# Patient Record
Sex: FEMALE | Race: WHITE | NOT HISPANIC OR LATINO | Employment: STUDENT | URBAN - METROPOLITAN AREA
[De-identification: names, ages, dates, MRNs, and addresses within clinical notes are randomized per-mention and may not be internally consistent; named-entity substitution may affect disease eponyms.]

---
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
## 2023-02-08 ENCOUNTER — TELEPHONE (OUTPATIENT)
Dept: NEUROLOGY | Facility: CLINIC | Age: 18
End: 2023-02-08

## 2023-02-08 DIAGNOSIS — R93.0 ABNORMAL MRI OF HEAD: ICD-10-CM

## 2023-02-08 DIAGNOSIS — E23.6 PITUITARY GLAND ENLARGED (HCC): Primary | ICD-10-CM

## 2023-02-08 NOTE — TELEPHONE ENCOUNTER
Did she ever have an MRI of the pituitary after this as I don't see any in our system?  I can TRY to get one approved if she has not had one with just telephone encounter with prior results.  However, might require a visit to get approval.  But it is not urgent at this time.  I will order the MRI.  Please have her get blood work done at least 2 days before MRI and have her call central scheduling

## 2023-02-08 NOTE — TELEPHONE ENCOUNTER
Received VM transcription from [phone]:    Hi, this is Lucy Mcdaniel. I was calling to speak with Dr. Gleason. I know something had been brought up with one of my neurologist previously and I aracely wanted her to follow up with it. It was just one of those things I just remembered. If you can give me a call back whenever that would be. Great, thank you.  --------------------------------------------------------    Spoke with pt and she states one of her prev neurologist had her get an MRI. Says there was something about her pituitary gland and she's wondering if some of the issues she's having could be related to this.   Pt says the MRI was from years ago, not exactly sure when. Pt asking Rosibel to review and see whether or not pt should get another MRI.    MRI results from 12/7/2017 in care everywhere:  Narrative    EXAM   MR BRAIN WITH/WITHOUT CONTRAST-9/20/2017 5:55 pm     HISTORY   R51 HEADACHE HEADACHES/MIGRAINES     COMPARISON   MRI BRAIN WITHOUT CONTRAST dated 11/28/2014     TECHNIQUE   Multiplanar multi-sequence images of the brain were obtained without and with IV contrast     FINDINGS   There is no evidence of mass effect, midline shift, subacute intraparenchymal hemorrhage, or extra-axial   fluid collections.   The ventricles are normal in size, shape, and configuration.   There is no evidence of abnormal restricted diffusion to suggest acute infarct.   Expected major vascular flow voids are seen.There is no evidence of abnormal enhancement.   Pituitary gland is 8.5 mm in craniocaudad dimension, which is prominent.  Dedicated MRI of the pituitary   gland can be performed.   Cerebellar tonsils:  Low lying, 2.8 mm below the level of the foramen magnum. Clivus:  normal.   Visualized paranasal sinuses:  Well aerated   Visualized mastoid air cells:  Well aerated     IMPRESSION   Pituitary gland is 8.5 mm in craniocaudad dimension, which is prominent.  Dedicated MRI of the pituitary   gland can be performed.    Low lying cerebellar tonsils, 2.8 mm below the level of the foramen magnum.        Rosibel - Please advise.    Best  [phone], ok to leave detailed message.

## 2023-02-13 NOTE — TELEPHONE ENCOUNTER
I ordered Bun and creatinine.  Need to check her kidney function prior to contrast.  Can she get a copy of the images on CD to have uploaded to our system as well?

## 2023-02-13 NOTE — TELEPHONE ENCOUNTER
Called and advised pt of all of the below. She verbalized clear understanding. Pt states that she had MRI of the pituitary in 2017.   CS already scheduled her MRI brain pituitary wo and w contrast.. It is scheduled on 3/20/23. Pt states that she was not to that she needed bw done prior to the procedure.      Rosibel, what bw you would like to order?

## 2023-02-14 NOTE — TELEPHONE ENCOUNTER
Pt made aware of the below. She verbalized understanding.   Pt states that she is unable to get the images or CD as she does not know where she had it done. It was done in 2017 and she was not in charge of her medical records then. She does not talk to her parents anymore.    chilo

## 2023-03-03 ENCOUNTER — PROCEDURE VISIT (OUTPATIENT)
Dept: NEUROLOGY | Facility: CLINIC | Age: 18
End: 2023-03-03

## 2023-03-03 VITALS — TEMPERATURE: 96.8 F | SYSTOLIC BLOOD PRESSURE: 110 MMHG | DIASTOLIC BLOOD PRESSURE: 80 MMHG

## 2023-03-03 DIAGNOSIS — G43.709 CHRONIC MIGRAINE WITHOUT AURA WITHOUT STATUS MIGRAINOSUS, NOT INTRACTABLE: Primary | ICD-10-CM

## 2023-03-03 DIAGNOSIS — G43.009 MIGRAINE WITHOUT AURA AND WITHOUT STATUS MIGRAINOSUS, NOT INTRACTABLE: ICD-10-CM

## 2023-03-03 RX ORDER — RIMEGEPANT SULFATE 75 MG/75MG
75 TABLET, ORALLY DISINTEGRATING ORAL EVERY OTHER DAY
Qty: 16 TABLET | Refills: 11 | Status: SHIPPED | OUTPATIENT
Start: 2023-03-03

## 2023-03-03 NOTE — PROGRESS NOTES

## 2023-03-20 ENCOUNTER — HOSPITAL ENCOUNTER (OUTPATIENT)
Dept: MRI IMAGING | Facility: HOSPITAL | Age: 18
Discharge: HOME | End: 2023-03-20

## 2023-03-20 DIAGNOSIS — E23.6 PITUITARY GLAND ENLARGED (HCC): ICD-10-CM

## 2023-03-20 DIAGNOSIS — R93.0 ABNORMAL MRI OF HEAD: ICD-10-CM

## 2023-03-20 RX ADMIN — GADOBUTROL 7 ML: 604.72 INJECTION INTRAVENOUS at 17:27

## 2023-04-19 PROBLEM — G43.709 CHRONIC MIGRAINE WITHOUT AURA WITHOUT STATUS MIGRAINOSUS, NOT INTRACTABLE: Status: ACTIVE | Noted: 2020-05-18

## 2023-05-11 ENCOUNTER — TELEPHONE (OUTPATIENT)
Dept: NEUROLOGY | Facility: CLINIC | Age: 18
End: 2023-05-11

## 2023-05-11 NOTE — TELEPHONE ENCOUNTER
Jennifer Martinez patient mother called to give information botox is to be billed with [facility] not [employer]      ID # [id number]  Group Rx [id number]  [address] [facility] [id number]    Please call Jennifer Martinez with any questions at [phone]

## 2023-05-17 ENCOUNTER — TELEPHONE (OUTPATIENT)
Dept: NEUROLOGY | Facility: CLINIC | Age: 18
End: 2023-05-17

## 2023-05-17 NOTE — TELEPHONE ENCOUNTER
[facility] and spoke to Elis in [employer] to have an account added for patients Botox order  Provided Elis with all of the patients demographics and current insurance info  Elis processed and created patient account and then transferred me to a pharmacist for the verbal Botox order needed  Provided the following Verbal RX to Kaylin (pharmacist) for:     Botox-200 units  Qty:1  Diagnosis: G43 709, Chronic Migraine  Sig: Inject up to 200 UNITS I M into the various sites in the head and neck once every three months for one year with  Refill:3    Kaylin did expedite this order to be processed STAT per my request     Will follow up on the status of this order

## 2023-05-17 NOTE — TELEPHONE ENCOUNTER
Called Yaima at Phone Number provided and got disconnected error  Called patient and she provided me with her Mothers correct Ph# of [phone]  Called Yaima(mother) who confirmed patients Botox prior-auth will need to be billed through their [facility]  Ronni Montero know I have submitted Botox authorization through patients [facility] benefit

## 2023-05-17 NOTE — TELEPHONE ENCOUNTER
"Received authorization response letter via fax from [address] [place] [address]  stating: \"In researching your fax request, the member's prescription benefit coverage indicates a Prior Authorization is NOT required  For additional questions regarding the medication's coverage under the [facility] plan, please contact Customer Service Ph: [phone]  \"    Called Customer Service and spoke to Michelle Sroensen in the [facility] who provided me with the following info:    Under Patients- Pharmacy Benefits:  Kb-[id number] for Botox-200 Units  Qty:1  Diagnosis: G43 709, Chronic Migraine  Sig: Inject up to 200 UNITS I M into the various sites in the head and neck once every three months  Since [facility] no longer [facility] Botox medication in order for me to find out which Specialty Pharmacy can dispense Botox for patients benefit plan Michelle Sorensen provided me with the following contact info: Patients Health Plan:  PEBTF-(PA-Employees-Benefit-[facility])  Care-Team: # [phone]                          "

## 2023-05-17 NOTE — TELEPHONE ENCOUNTER
[facility]:     Botox-200 units: [id number]  DX: G43 709, Chronic Migraine  Awaiting approval/denial response  Will follow up on the status of pending authorization requested

## 2023-05-17 NOTE — TELEPHONE ENCOUNTER
Called PEF: Care-Team at  Ph# [phone] and spoke to [facility] in the Benefits Department who provided me with the following list of Specialty Pharmacies that can dispense Botox for this patients plan:    [facility]  [facility]- (Ph: [phone])     Will contact [address] [place] [address] Edward Erazo for patients Botox order request

## 2023-05-22 NOTE — TELEPHONE ENCOUNTER
[facility] and spoke with Gay Sam regarding the delivery status of patients Botox medication  Michael Hayward informed me that patients Botox order/delivery is pending patients verbal consent to have Botox delivered along with awaiting for patient to apply for the co-pay assistance program since she does owe co-pay amount of $120 00 for their Botox      Michael Hayward informed me that she was able to get in contact with the patient in regards to this and that the patient informed her that she will be calling and applying for the Botox assistance program this week in order for her Botox medication to be delivered to our office prior to her 6/02/2023 Botox-Appt  I also called patient and left VM message regarding this status as well      Will follow up on the status of this order later this week

## 2023-05-24 NOTE — TELEPHONE ENCOUNTER
[facility] and spoke with Emperatriz Baez regarding the delivery status of patients Botox medication  Michael informed me that patients Botox order/delivery is still pending patients verbal consent to have Botox delivered along with awaiting for patient to apply for the co-pay assistance program since she does owe co-pay amount of $120 00 for their Botox  Called patient and left 2nd VM message requesting they reach out to [facility] ASAP and provide them w/ any and all info needed in order to have Botox medication delivered to our office prior to their upcoming Botox appointment on 6/02/2023  Will follow up on the status of this order by Friday 5/26/2023

## 2023-05-25 NOTE — TELEPHONE ENCOUNTER
Contacted pt regarding order will Mariaelena sarkar due to Rep at [facility] stating pt called and stated they wanted to check into other pharm and will re-contact  Pt mom returned call and stated, not sure as to what that was about  Writer advised of use of sp now an the process for further need; co-pay/consent  Pt mom stated they Botox savings and not sure of what to do  Writer advised to contact with all that information, give consent and once benefits for payment for remaining balance is taken care of they will contact our office for delivery or we will call next business day   Mom satisfied with call and advised will complete with daughter later this afternoon  Will continue to follow up with Mariaelena sarkar

## 2023-05-26 NOTE — TELEPHONE ENCOUNTER
[facility] and scheduled delivery with Liza Mcardle for:    Botox-200 units  Qty:1  Delivery to [facility]  Scheduled for 5/31/2023  Via: FedEx    Please advise if medication doesn't arrive

## 2023-06-02 ENCOUNTER — PROCEDURE VISIT (OUTPATIENT)
Dept: NEUROLOGY | Facility: CLINIC | Age: 18
End: 2023-06-02

## 2023-06-02 VITALS — HEART RATE: 104 BPM | TEMPERATURE: 97.4 F | DIASTOLIC BLOOD PRESSURE: 73 MMHG | SYSTOLIC BLOOD PRESSURE: 131 MMHG

## 2023-06-02 DIAGNOSIS — G43.709 CHRONIC MIGRAINE WITHOUT AURA WITHOUT STATUS MIGRAINOSUS, NOT INTRACTABLE: Primary | ICD-10-CM

## 2023-06-02 NOTE — PROGRESS NOTES
"  Universal Protocol   Consent: Verbal consent obtained  Written consent obtained  Risks and benefits: risks, benefits and alternatives were discussed  Consent given by: patient  Time out: Immediately prior to procedure a \"time out\" was called to verify the correct patient, procedure, equipment, support staff and site/side marked as required  Patient understanding: patient states understanding of the procedure being performed  Patient consent: the patient's understanding of the procedure matches consent given  Procedure consent: procedure consent matches procedure scheduled  Relevant documents: relevant documents present and verified  Patient identity confirmed: verbally with patient        Chemodenervation     Date/Time 6/2/2023 2:15 PM     Performed by  Lili Harvey PA-C   Authorized by Lili Harvey PA-C       Pre-procedure details      Prepped With: Alcohol     Anesthesia  (see MAR for exact dosages):      Anesthesia method:  None   Procedure details     Position:  Upright   Botox     Botox Type:  Type A    Brand:  Botox    mL's of Botulinum Toxin:  200    Final Concentration per CC:  50 units    Needle Gauge:  30 G 2 5 inch   Procedures     Botox Procedures: chronic headache      Indications: migraines     Injection Location      Head / Face:  L superior cervical paraspinal, R superior cervical paraspinal, L , R , L frontalis, R frontalis, L medial occipitalis, R medial occipitalis, procerus, R temporalis, L temporalis, R superior trapezius and L superior trapezius    L  injection amount:  5 unit(s)    R  injection amount:  5 unit(s)    L lateral frontalis:  5 unit(s)    R lateral frontalis:  5 unit(s)    L medial frontalis:  5 unit(s)    R medial frontalis:  5 unit(s)    L temporalis injection amount:  20 unit(s)    R temporalis injection amount:  20 unit(s)    Procerus injection amount:  5 unit(s)    L medial occipitalis injection amount:  15 unit(s)    R medial " occipitalis injection amount:  15 unit(s)    L superior cervical paraspinal injection amount:  10 unit(s)    R superior cervical paraspinal injection amount:  10 unit(s)    L superior trapezius injection amount:  15 unit(s)    R superior trapezius injection amount:  15 unit(s)   Total Units     Total units used:  200    Total units discarded:  0   Post-procedure details      Chemodenervation:  Chronic migraine    Facial Nerve Location[de-identified]  Bilateral facial nerve    Patient tolerance of procedure:   Tolerated well, no immediate complications   Comments      45 units frontalis and temporalis  All medically necessary

## 2023-07-26 DIAGNOSIS — G43.009 MIGRAINE WITHOUT AURA AND WITHOUT STATUS MIGRAINOSUS, NOT INTRACTABLE: Primary | ICD-10-CM

## 2023-07-26 RX ORDER — DEXAMETHASONE 1 MG
1 TABLET ORAL DAILY PRN
Qty: 5 TABLET | Refills: 0 | Status: SHIPPED | OUTPATIENT
Start: 2023-07-26

## 2023-07-26 RX ORDER — DIVALPROEX SODIUM 500 MG/1
500 TABLET, EXTENDED RELEASE ORAL DAILY
Qty: 5 TABLET | Refills: 0 | Status: SHIPPED | OUTPATIENT
Start: 2023-07-26

## 2023-08-18 ENCOUNTER — TELEPHONE (OUTPATIENT)
Dept: NEUROLOGY | Facility: CLINIC | Age: 18
End: 2023-08-18

## 2023-08-18 NOTE — TELEPHONE ENCOUNTER
[facility], spoke to rep that advised pt consent is needed to ship; contacted x3 by pharmacy. Writer contacted pt and conference her in call; consent given and delivery set up for 8/22/2023. Botox 200 UNITS  Qty. 1  Scheduled:8/22/2023  Location:[facility]. [place]   Via: Ups/Fedex    Please advise if Botox does not arrive. Thank you.

## 2023-08-22 NOTE — TELEPHONE ENCOUNTER
Botox number of units: 200 units  Botox quantity: 1  Arrived at [facility]  Botox at Correct Administering Location: Yes  [address] number: [id number]  Lot number: [id number]  Expiration Date: 02/01/2026  Appt notes indicate correct medication: Yes Patient advised to take 12.5mg 12/5/18 then resume regular dose    Advised follow up 12/19/18   Patient verbalized understanding

## 2023-09-01 ENCOUNTER — PROCEDURE VISIT (OUTPATIENT)
Dept: NEUROLOGY | Facility: CLINIC | Age: 18
End: 2023-09-01
Payer: COMMERCIAL

## 2023-09-01 VITALS
SYSTOLIC BLOOD PRESSURE: 125 MMHG | HEIGHT: 64 IN | HEART RATE: 80 BPM | DIASTOLIC BLOOD PRESSURE: 65 MMHG | BODY MASS INDEX: 29.97 KG/M2 | TEMPERATURE: 97.5 F

## 2023-09-01 DIAGNOSIS — R79.89 ELEVATED PTHRP LEVEL: Primary | ICD-10-CM

## 2023-09-01 DIAGNOSIS — G43.709 CHRONIC MIGRAINE WITHOUT AURA WITHOUT STATUS MIGRAINOSUS, NOT INTRACTABLE: Primary | ICD-10-CM

## 2023-09-01 PROCEDURE — 64615 CHEMODENERV MUSC MIGRAINE: CPT | Performed by: PHYSICIAN ASSISTANT

## 2023-09-01 NOTE — PROGRESS NOTES
Universal Protocol   Consent: Verbal consent obtained. Written consent obtained. Risks and benefits: risks, benefits and alternatives were discussed  Consent given by: patient  Time out: Immediately prior to procedure a "time out" was called to verify the correct patient, procedure, equipment, support staff and site/side marked as required. Patient understanding: patient states understanding of the procedure being performed  Patient consent: the patient's understanding of the procedure matches consent given  Procedure consent: procedure consent matches procedure scheduled  Relevant documents: relevant documents present and verified  Patient identity confirmed: verbally with patient        Chemodenervation     Date/Time 9/1/2023 11:30 AM     Performed by  David Ma PA-C   Authorized by David Ma PA-C       Pre-procedure details      Prepped With: Alcohol     Anesthesia  (see MAR for exact dosages):      Anesthesia method:  None   Procedure details     Position:  Upright   Botox     Botox Type:  Type A    Brand:  Botox    mL's of Botulinum Toxin:  200    Final Concentration per CC:  50 units    Needle Gauge:  30 G 2.5 inch   Procedures     Botox Procedures: chronic headache      Indications: migraines     Injection Location      Head / Face:  L superior cervical paraspinal, R superior cervical paraspinal, L , R , L frontalis, R frontalis, L medial occipitalis, R medial occipitalis, procerus, R temporalis, L temporalis, R superior trapezius and L superior trapezius    L  injection amount:  5 unit(s)    R  injection amount:  5 unit(s)    L lateral frontalis:  5 unit(s)    R lateral frontalis:  5 unit(s)    L medial frontalis:  5 unit(s)    R medial frontalis:  5 unit(s)    L temporalis injection amount:  20 unit(s)    R temporalis injection amount:  20 unit(s)    Procerus injection amount:  5 unit(s)    L medial occipitalis injection amount:  15 unit(s)    R medial occipitalis injection amount:  15 unit(s)    L superior cervical paraspinal injection amount:  10 unit(s)    R superior cervical paraspinal injection amount:  10 unit(s)    L superior trapezius injection amount:  15 unit(s)    R superior trapezius injection amount:  15 unit(s)   Total Units     Total units used:  200    Total units discarded:  0   Post-procedure details      Chemodenervation:  Chronic migraine    Facial Nerve Location[de-identified]  Bilateral facial nerve    Patient tolerance of procedure:   Tolerated well, no immediate complications   Comments      45 units frontalis and temporalis  All medically necessary

## 2023-10-03 ENCOUNTER — TELEPHONE (OUTPATIENT)
Dept: NEUROLOGY | Facility: CLINIC | Age: 18
End: 2023-10-03

## 2023-10-03 NOTE — TELEPHONE ENCOUNTER
received vm from 10/2 at 12:45pm-To this is Yaima jarrod. I'm calling regarding lilia garay, date of birth [birth year]. I keep getting a bill from [facility] for her botox, and I'm having a very hard time getting to where i need to go,  after multiple phone calls with the insurance company. The diagnosis code is being billed incorrectly, which is why it keeps coming back to me for $458.00. Can somebody please call me back? whoever can help me with this diagnosis code, so it gets billed properly. Again, phone number is [phone]. Thank you.

## 2023-10-04 NOTE — TELEPHONE ENCOUNTER
I called and spoke with Sandra at [facility] today at 9:53 am, call ref #[id number], the diagnosis code for chronic migraine, G43.709, is an allowable ICD10 dx code under the member's plan. Claim was incorrectly denied, and claim has been sent for reprocessing; allow 20 calendar days for payment.    I called and left a voicemail for the patient in regards to the above and to please call me directly with any further questions.  Skyler back line given.

## 2023-11-09 ENCOUNTER — TELEPHONE (OUTPATIENT)
Dept: NEUROLOGY | Facility: CLINIC | Age: 18
End: 2023-11-09

## 2023-11-09 NOTE — TELEPHONE ENCOUNTER
Contacted [facility] pharmacy, spoke to rep that advised PA needed. Writer advised of JOAO. Given Key code by rep to complete on CMM Key: [id number] - PA Case ID: [id number]    Will await approval/denial determination .

## 2023-11-13 NOTE — TELEPHONE ENCOUNTER
Received fax with pt approval details below, from [employer]. Approved   Botox 200 UNITS  Qty. 1  Auth# PA Case ID: [id number]   Valid:11/9/2023-/11/9/2024  Visits: 4    Please use [employer] <--- Click to Launch ICDx for PreOp, PostOp and Procedure

## 2023-11-14 ENCOUNTER — TELEPHONE (OUTPATIENT)
Dept: NEUROLOGY | Facility: CLINIC | Age: 18
End: 2023-11-14

## 2023-11-14 NOTE — TELEPHONE ENCOUNTER
Contacted [facility], spoke to Meli as she advised we can set up proactive scheduling for pt. Consent needed, no co-pay and have tried reaching pt a couple of times and will reach out again today for pt consent. Delivery 15-21. Please advise if Botox does not arrive.

## 2023-11-15 ENCOUNTER — HOSPITAL ENCOUNTER (EMERGENCY)
Facility: HOSPITAL | Age: 18
Discharge: HOME | End: 2023-11-15
Attending: EMERGENCY MEDICINE
Payer: COMMERCIAL

## 2023-11-15 ENCOUNTER — NURSE TRIAGE (OUTPATIENT)
Dept: OTHER | Facility: OTHER | Age: 18
End: 2023-11-15

## 2023-11-15 VITALS
WEIGHT: 160 LBS | HEART RATE: 97 BPM | RESPIRATION RATE: 18 BRPM | TEMPERATURE: 97.8 F | OXYGEN SATURATION: 99 % | DIASTOLIC BLOOD PRESSURE: 78 MMHG | HEIGHT: 63 IN | BODY MASS INDEX: 28.35 KG/M2 | SYSTOLIC BLOOD PRESSURE: 121 MMHG

## 2023-11-15 DIAGNOSIS — G43.909 MIGRAINE: Primary | ICD-10-CM

## 2023-11-15 PROCEDURE — 96372 THER/PROPH/DIAG INJ SC/IM: CPT

## 2023-11-15 PROCEDURE — 99282 EMERGENCY DEPT VISIT SF MDM: CPT

## 2023-11-15 PROCEDURE — 96365 THER/PROPH/DIAG IV INF INIT: CPT

## 2023-11-15 PROCEDURE — 96375 TX/PRO/DX INJ NEW DRUG ADDON: CPT

## 2023-11-15 PROCEDURE — 99284 EMERGENCY DEPT VISIT MOD MDM: CPT | Performed by: NURSE PRACTITIONER

## 2023-11-15 RX ORDER — DEXTROAMPHETAMINE SACCHARATE, AMPHETAMINE ASPARTATE MONOHYDRATE, DEXTROAMPHETAMINE SULFATE AND AMPHETAMINE SULFATE 2.5; 2.5; 2.5; 2.5 MG/1; MG/1; MG/1; MG/1
10 CAPSULE, EXTENDED RELEASE ORAL 2 TIMES DAILY
COMMUNITY

## 2023-11-15 RX ORDER — MAGNESIUM SULFATE HEPTAHYDRATE 40 MG/ML
2 INJECTION, SOLUTION INTRAVENOUS ONCE
Status: COMPLETED | OUTPATIENT
Start: 2023-11-15 | End: 2023-11-15

## 2023-11-15 RX ORDER — METOCLOPRAMIDE HYDROCHLORIDE 5 MG/ML
10 INJECTION INTRAMUSCULAR; INTRAVENOUS ONCE
Status: COMPLETED | OUTPATIENT
Start: 2023-11-15 | End: 2023-11-15

## 2023-11-15 RX ORDER — AMOXICILLIN 500 MG/1
500 TABLET, FILM COATED ORAL 3 TIMES DAILY
COMMUNITY
Start: 2023-10-03

## 2023-11-15 RX ORDER — KETOROLAC TROMETHAMINE 30 MG/ML
30 INJECTION, SOLUTION INTRAMUSCULAR; INTRAVENOUS ONCE
Status: COMPLETED | OUTPATIENT
Start: 2023-11-15 | End: 2023-11-15

## 2023-11-15 RX ORDER — ONABOTULINUMTOXINA 200 [USP'U]/1
INJECTION, POWDER, LYOPHILIZED, FOR SOLUTION INTRADERMAL; INTRAMUSCULAR
COMMUNITY
Start: 2023-11-10

## 2023-11-15 RX ORDER — DIPHENHYDRAMINE HYDROCHLORIDE 50 MG/ML
25 INJECTION INTRAMUSCULAR; INTRAVENOUS ONCE
Status: COMPLETED | OUTPATIENT
Start: 2023-11-15 | End: 2023-11-15

## 2023-11-15 RX ORDER — SUMATRIPTAN 6 MG/.5ML
6 INJECTION, SOLUTION SUBCUTANEOUS ONCE
Status: COMPLETED | OUTPATIENT
Start: 2023-11-15 | End: 2023-11-15

## 2023-11-15 RX ORDER — PREDNISONE 10 MG/1
10 TABLET ORAL DAILY
COMMUNITY
Start: 2023-11-14

## 2023-11-15 RX ORDER — PROPRANOLOL HYDROCHLORIDE 10 MG/1
10 TABLET ORAL 2 TIMES DAILY PRN
COMMUNITY
Start: 2023-10-31

## 2023-11-15 RX ORDER — DROSPIRENONE AND ETHINYL ESTRADIOL 0.02-3(28)
1 KIT ORAL DAILY
COMMUNITY
Start: 2023-10-15

## 2023-11-15 RX ORDER — ALBUTEROL SULFATE 90 UG/1
2 AEROSOL, METERED RESPIRATORY (INHALATION) EVERY 6 HOURS PRN
COMMUNITY
Start: 2023-11-14

## 2023-11-15 RX ADMIN — MAGNESIUM SULFATE HEPTAHYDRATE 2 G: 40 INJECTION, SOLUTION INTRAVENOUS at 09:22

## 2023-11-15 RX ADMIN — KETOROLAC TROMETHAMINE 30 MG: 30 INJECTION, SOLUTION INTRAMUSCULAR at 09:22

## 2023-11-15 RX ADMIN — DIPHENHYDRAMINE HYDROCHLORIDE 25 MG: 50 INJECTION, SOLUTION INTRAMUSCULAR; INTRAVENOUS at 09:23

## 2023-11-15 RX ADMIN — METOCLOPRAMIDE 10 MG: 5 INJECTION, SOLUTION INTRAMUSCULAR; INTRAVENOUS at 09:28

## 2023-11-15 RX ADMIN — SUMATRIPTAN 6 MG: 6 INJECTION, SOLUTION SUBCUTANEOUS at 09:25

## 2023-11-15 RX ADMIN — SODIUM CHLORIDE 1000 ML: 0.9 INJECTION, SOLUTION INTRAVENOUS at 09:21

## 2023-11-15 NOTE — TELEPHONE ENCOUNTER
Regarding: Severe neck pain affecting vision  ----- Message from Betsy Sampson sent at 11/15/2023  6:40 AM EST -----  My daughter has severe pain in the back of her neck affecting her vision for about an hour

## 2023-11-15 NOTE — Clinical Note
Kimber Ivet was seen and treated in our emergency department on 11/15/2023. Diagnosis:     Daniel Dye  may return to work on return date. She may return on this date: 11/17/2023         If you have any questions or concerns, please don't hesitate to call.       KAI Damico    ______________________________           _______________          _______________  Hospital Representative                              Date                                Time

## 2023-11-15 NOTE — TELEPHONE ENCOUNTER
Reason for Disposition  • [1] SEVERE headache (e.g., excruciating) AND [2] "worst headache" of life    Answer Assessment - Initial Assessment Questions  1. DESCRIPTION: "What is the vision loss like? Describe it for me." (e.g., complete vision loss, blurred vision, double vision, floaters, etc.)      "Dark around the edges"    2. LOCATION: "One or both eyes?" If one, ask: "Which eye?"      Both    3. SEVERITY: "Can you see anything?" If Yes, ask: "What can you see?" (e.g., fine print)      yes    4. ONSET: "When did this begin?" "Did it start suddenly or has this been gradual?"      One hour ago    5. PATTERN: "Does this come and go, or has it been constant since it started?"      Constant     6. PAIN: "Is there any pain in your eye(s)?"  (Scale 1-10; or mild, moderate, severe)       Behind eyes    7. CONTACTS-GLASSES: "Do you wear contacts or glasses?"      Denies    8. CAUSE: "What do you think is causing this visual problem?"      Migraine, took a Nurtec    9. OTHER SYMPTOMS: "Do you have any other symptoms?" (e.g., confusion, headache, arm or leg weakness, speech problems)      Severe neck pain posterior neck, vomited once     10. PREGNANCY: "Is there any chance you are pregnant?" "When was your last menstrual period?"       Denies     Is being treated for a sinus infection. Nurtec taken one hour ago. Contacted on-call provider who recommends patient be seen in the ER for evaluation. Contacted patient's mom and made her aware of on-call providers recommendation. Mom was agreeable and reports that father will be taking patient to [facility] shortly.     Protocols used: Vision Loss or Change-ADULT-AH, Headache-ADULT-AH

## 2023-11-15 NOTE — TELEPHONE ENCOUNTER
Patient was discharged from the emergency room. I do not see any of her vision issues.   Please call to check on the patient later or tomorrow

## 2023-11-15 NOTE — ED PROVIDER NOTES
History  Chief Complaint   Patient presents with    Migraine     Pt c/o severe migraine. Pt has hx of migraines and follows with neurology, last botox inj. was approx. 3 weeks ago for treatment     Female patient presenting here with a chief complaint of migraine headache. She has a history of migraine headache is followed by neurology. She receives regular Botox injections. She took her abortive medication Nurtec this morning without any relief. Her migraine is a pressure sensation over the posterior occiput area. No neck tenderness or rigidity. No fever no chills. Migraine is the same as previous no new features. Associated nausea and photophobia      Migraine  Associated symptoms: headaches    Associated symptoms: no abdominal pain, no chest pain, no cough, no ear pain, no fever, no rash, no shortness of breath, no sore throat and no vomiting        Prior to Admission Medications   Prescriptions Last Dose Informant Patient Reported? Taking?    Botox 200 units SOLR   Yes Yes   NAPROXEN PO  Mother Yes No   Sig: Take 220 mg by mouth as needed for mild pain   Rimegepant Sulfate (Nurtec) 75 MG TBDP   No No   Sig: Take 75 mg by mouth every other day   Patient taking differently: Take 75 mg by mouth if needed   Vestura 3-0.02 MG per tablet   Yes Yes   Sig: Take 1 tablet by mouth daily   albuterol (PROVENTIL HFA,VENTOLIN HFA) 90 mcg/act inhaler   Yes Yes   Sig: Inhale 2 puffs every 6 (six) hours as needed   amoxicillin (AMOXIL) 500 MG tablet   Yes Yes   Sig: Take 500 mg by mouth 3 (three) times a day For 7 days   amphetamine-dextroamphetamine (ADDERALL XR) 10 MG 24 hr capsule   Yes No   Sig: Take 10 mg by mouth 2 (two) times a day   predniSONE 10 mg tablet   Yes Yes   Sig: Take 10 mg by mouth daily   propranolol (INDERAL) 10 mg tablet   Yes Yes   Sig: Take 10 mg by mouth 2 (two) times a day as needed      Facility-Administered Medications: None       Past Medical History:   Diagnosis Date    ADD (attention deficit disorder)     Anxiety     High cholesterol     Migraine        Past Surgical History:   Procedure Laterality Date    APPENDECTOMY      KIDNEY STONE SURGERY Left        Family History   Problem Relation Age of Onset    Hypertension Father     Migraines Other      I have reviewed and agree with the history as documented. E-Cigarette/Vaping    E-Cigarette Use Never User      E-Cigarette/Vaping Substances    Nicotine No     THC No     CBD No     Flavoring No     Other No     Unknown No      Social History     Tobacco Use    Smoking status: Never    Smokeless tobacco: Never   Vaping Use    Vaping Use: Never used   Substance Use Topics    Alcohol use: Never    Drug use: Never       Review of Systems   Constitutional:  Negative for chills and fever. HENT:  Negative for ear pain and sore throat. Eyes:  Negative for pain and visual disturbance. Respiratory:  Negative for cough and shortness of breath. Cardiovascular:  Negative for chest pain and palpitations. Gastrointestinal:  Negative for abdominal pain and vomiting. Genitourinary:  Negative for dysuria and hematuria. Musculoskeletal:  Negative for arthralgias and back pain. Skin:  Negative for color change and rash. Neurological:  Positive for headaches. Negative for seizures and syncope. All other systems reviewed and are negative. Physical Exam  Physical Exam  Vitals and nursing note reviewed. Constitutional:       General: She is not in acute distress. Appearance: She is well-developed. She is not ill-appearing or toxic-appearing. HENT:      Head: Normocephalic and atraumatic. Nose: No rhinorrhea. Mouth/Throat:      Mouth: Mucous membranes are moist.      Dentition: Normal dentition. Eyes:      General:         Right eye: No discharge. Left eye: No discharge. Cardiovascular:      Rate and Rhythm: Normal rate and regular rhythm.    Pulmonary:      Effort: Pulmonary effort is normal. No accessory muscle usage or respiratory distress. Abdominal:      General: There is no distension. Tenderness: There is no guarding. Musculoskeletal:         General: Normal range of motion. Cervical back: Normal range of motion and neck supple. No rigidity. Skin:     General: Skin is warm and dry. Neurological:      Mental Status: She is alert and oriented to person, place, and time. Coordination: Coordination normal.   Psychiatric:         Behavior: Behavior is cooperative. Vital Signs  ED Triage Vitals   Temperature Pulse Respirations Blood Pressure SpO2   11/15/23 0852 11/15/23 0852 11/15/23 0852 11/15/23 0854 11/15/23 0852   97.8 °F (36.6 °C) 97 18 121/78 99 %      Temp Source Heart Rate Source Patient Position - Orthostatic VS BP Location FiO2 (%)   11/15/23 0852 11/15/23 0852 11/15/23 0854 11/15/23 0854 --   Oral Monitor Lying Right arm       Pain Score       11/15/23 0900       10 - Worst Possible Pain           Vitals:    11/15/23 0852 11/15/23 0854   BP:  121/78   Pulse: 97    Patient Position - Orthostatic VS:  Lying         Visual Acuity      ED Medications  Medications   ketorolac (TORADOL) injection 30 mg (30 mg Intravenous Given 11/15/23 0922)   metoclopramide (REGLAN) injection 10 mg (10 mg Intravenous Given 11/15/23 0928)   diphenhydrAMINE (BENADRYL) injection 25 mg (25 mg Intravenous Given 11/15/23 0231)   SUMAtriptan (IMITREX) subcutaneous injection 6 mg (6 mg Subcutaneous Given 11/15/23 0925)   magnesium sulfate 2 g/50 mL IVPB (premix) 2 g (0 g Intravenous Stopped 11/15/23 1022)   sodium chloride 0.9 % bolus 1,000 mL (0 mL Intravenous Stopped 11/15/23 1030)       Diagnostic Studies  Results Reviewed       None                   No orders to display              Procedures  Procedures         ED Course         CRAFFT      Flowsheet Row Most Recent Value   CRAFFT Initial Screen: During the past 12 months, did you:    1. Drink any alcohol (more than a few sips)? No Filed at: 11/15/2023 0856   2. Smoke any marijuana or hashish No Filed at: 11/15/2023 0856   3. Use anything else to get high? ("anything else" includes illegal drugs, over the counter and prescription drugs, and things that you sniff or 'lewis')? No Filed at: 11/15/2023 6708                                            Medical Decision Making  Patient improved after migraine cocktail okay for discharge home. Risk  Prescription drug management. Disposition  Final diagnoses:   Migraine     Time reflects when diagnosis was documented in both MDM as applicable and the Disposition within this note       Time User Action Codes Description Comment    11/15/2023 10:15 AM Selvin Zora Add [G43.909] Migraine           ED Disposition       ED Disposition   Discharge    Condition   Stable    Date/Time   Wed Nov 15, 2023 1015    [address] discharge to home/self care.                    Follow-up Information       Follow up With Specialties Details Why Contact Info    Obdulia Craig MD Pediatrics  As needed [address] [facility]  [phone]              Discharge Medication List as of 11/15/2023 10:16 AM        CONTINUE these medications which have NOT CHANGED    Details   albuterol (PROVENTIL HFA,VENTOLIN HFA) 90 mcg/act inhaler Inhale 2 puffs every 6 (six) hours as needed, Starting Tue 11/14/2023, Historical Med      amoxicillin (AMOXIL) 500 MG tablet Take 500 mg by mouth 3 (three) times a day For 7 days, Starting Tue 10/3/2023, Historical Med      Botox 200 units SOLR Historical Med      predniSONE 10 mg tablet Take 10 mg by mouth daily, Starting Tue 11/14/2023, Historical Med      propranolol (INDERAL) 10 mg tablet Take 10 mg by mouth 2 (two) times a day as needed, Starting Tue 10/31/2023, Historical Med      Vestura 3-0.02 MG per tablet Take 1 tablet by mouth daily, Starting Sun 10/15/2023, Historical Med      amphetamine-dextroamphetamine (ADDERALL XR) 10 MG 24 hr capsule Take 10 mg by mouth 2 (two) times a day, Historical Med      NAPROXEN PO Take 220 mg by mouth as needed for mild pain, Historical Med      Rimegepant Sulfate (Nurtec) 75 MG TBDP Take 75 mg by mouth every other day, Starting Fri 3/3/2023, Normal             No discharge procedures on file.     PDMP Review       None            ED Provider  Electronically Signed by             KAI Reardon  11/15/23 4889

## 2023-11-16 ENCOUNTER — PATIENT MESSAGE (OUTPATIENT)
Dept: NEUROLOGY | Facility: CLINIC | Age: 18
End: 2023-11-16

## 2023-11-16 ENCOUNTER — TELEPHONE (OUTPATIENT)
Dept: NEUROLOGY | Facility: CLINIC | Age: 18
End: 2023-11-16

## 2023-11-16 DIAGNOSIS — G43.709 CHRONIC MIGRAINE WITHOUT AURA WITHOUT STATUS MIGRAINOSUS, NOT INTRACTABLE: Primary | ICD-10-CM

## 2023-11-16 RX ORDER — METOCLOPRAMIDE 5 MG/1
5 TABLET ORAL 2 TIMES DAILY PRN
Qty: 15 TABLET | Refills: 0 | Status: SHIPPED | OUTPATIENT
Start: 2023-11-16

## 2023-11-16 RX ORDER — CYCLOBENZAPRINE HCL 5 MG
5 TABLET ORAL 2 TIMES DAILY PRN
Qty: 30 TABLET | Refills: 0 | Status: SHIPPED | OUTPATIENT
Start: 2023-11-16

## 2023-11-16 NOTE — TELEPHONE ENCOUNTER
See health call encounter from 11/15  -----------------------------------------------------------------------    KEYLA Mooney-CYesterday (11:50 AM)       Patient was discharged from the emergency room. I do not see any of her vision issues.   Please call to check on the patient later or tomorrow

## 2023-11-16 NOTE — TELEPHONE ENCOUNTER
Called pt. She states that the vision issues were brought up at the ER. Meds in ER were effective but pain came back about 3-4 hours ago. She took nurtec around noon and did not help at all. And being treated for sinus infection, on prednisone and amoxicillin. These were started 2 days ago. Also took Otc decongestant  pressure in back of her neck that radiated through the rest of my head. Vision is fuzzy, especially when she stand up. Almost feels like she is going to pass out when she stand up   Current pain scale-5-10. Slight nausea. Only on botox for her migraines  Nurtec usually works  She does not think that she has cyclobenzaprine as noted in last office note and states that she does not remember taking cyproheptadine as per last office note.     States that Neck pressure is new    Please advise  [phone]- to leave detailed message or ok to send mychart message

## 2023-11-16 NOTE — TELEPHONE ENCOUNTER
KAI Foreman  You2 minutes ago (4:09 PM)       I can prescribe the cyclobenzaprine and reglan that she used previously  She should continue her treatment for sinus infection if this was recently diagnosed  She should make sure to stay well hydrated  If she has more severe vision change (vision loss, double vision), fever, worst headache of life or weakness she should go back to the ED.

## 2023-11-16 NOTE — TELEPHONE ENCOUNTER
ovember 16, 2023  Padmini Robison   to [address] [place] [address] Clinical Team 5 (supporting Kathryn Grier)         11/16/23  3:04 PM  I was in the hospital yesterday for intense pressure in back of my neck that radiated through the rest of my head. Today the pain and pressure is coming back and my vision gets fuzzy around the edges when I stand up. What should I do or take. I've made multiple calls to the office and no one is getting back to me.

## 2023-11-16 NOTE — TELEPHONE ENCOUNTER
Pt called in she was in the ED on 11/15. She said the pain is coming back and needs to speak to a nurse ASAP.

## 2023-11-17 NOTE — TELEPHONE ENCOUNTER
Botox number of units: 200 units  Botox quantity: 1  Arrived at what location: [facility]  Botox at Correct Administering Location: Yes  [address] number: [id number]  Lot number: [id number]  Expiration Date: 02/01/2026  Appt notes indicate correct medication: Yes

## 2023-11-20 NOTE — TELEPHONE ENCOUNTER
received vm from 11/16 at 3:10pm- I'm calling for my daughter, Keesha Almazan, date of birth, [birth year]. She was being in the ER yesterday. [facility]'s in [place] for a migraine given the IV treatment, sent home, she took the nurtec again today,  the symptoms are returning.  We need someone to call us back So we know what she can take to try to break it before she has to go back to the ER again, Please return my call. [phone].  ------------------------------------------------  Already addressed

## 2023-11-20 NOTE — TELEPHONE ENCOUNTER
received vm from 11/16 at 1:53pm-Hi, this is Hali Alvarado. Birth date [birth year]. I had a migraine yesterday. the on call neurologist sent me to the ER and the pain is coming back today. So if someone could give me a call back, that be great. Thank you.   [phone]  ----------------------------------------  already addressed

## 2023-12-01 ENCOUNTER — PROCEDURE VISIT (OUTPATIENT)
Dept: NEUROLOGY | Facility: CLINIC | Age: 18
End: 2023-12-01
Payer: COMMERCIAL

## 2023-12-01 VITALS — DIASTOLIC BLOOD PRESSURE: 79 MMHG | SYSTOLIC BLOOD PRESSURE: 125 MMHG | HEART RATE: 104 BPM | TEMPERATURE: 98.9 F

## 2023-12-01 DIAGNOSIS — G43.709 CHRONIC MIGRAINE WITHOUT AURA WITHOUT STATUS MIGRAINOSUS, NOT INTRACTABLE: Primary | ICD-10-CM

## 2023-12-01 PROCEDURE — 64615 CHEMODENERV MUSC MIGRAINE: CPT | Performed by: PHYSICIAN ASSISTANT

## 2023-12-01 NOTE — PROGRESS NOTES
Universal Protocol   Consent: Verbal consent obtained. Written consent obtained. Risks and benefits: risks, benefits and alternatives were discussed  Consent given by: patient  Time out: Immediately prior to procedure a "time out" was called to verify the correct patient, procedure, equipment, support staff and site/side marked as required. Patient understanding: patient states understanding of the procedure being performed  Patient consent: the patient's understanding of the procedure matches consent given  Procedure consent: procedure consent matches procedure scheduled  Relevant documents: relevant documents present and verified  Patient identity confirmed: verbally with patient      Chemodenervation     Date/Time  12/1/2023 2:00 PM     Performed by  June Kuo PA-C   Authorized by  June Kuo PA-C     Pre-procedure details      Prepped With: Alcohol     Anesthesia  (see MAR for exact dosages):      Anesthesia method:  None   Procedure details      Position:  Upright   Botox      Botox Type:  Type A    Brand:  Botox    mL's of Botulinum Toxin:  200    Final Concentration per CC:  50 units    Needle Gauge:  30 G 2.5 inch   Procedures      Botox Procedures: chronic headache      Indications: migraines     Injection Location      Head / Face:  L superior cervical paraspinal, R superior cervical paraspinal, L , R , L frontalis, R frontalis, L medial occipitalis, R medial occipitalis, procerus, R temporalis, L temporalis, R superior trapezius and L superior trapezius    L  injection amount:  5 unit(s)    R  injection amount:  5 unit(s)    L lateral frontalis:  5 unit(s)    R lateral frontalis:  5 unit(s)    L medial frontalis:  5 unit(s)    R medial frontalis:  5 unit(s)    L temporalis injection amount:  20 unit(s)    R temporalis injection amount:  20 unit(s)    Procerus injection amount:  5 unit(s)    L medial occipitalis injection amount:  15 unit(s)    R medial occipitalis injection amount:  15 unit(s)    L superior cervical paraspinal injection amount:  10 unit(s)    R superior cervical paraspinal injection amount:  10 unit(s)    L superior trapezius injection amount:  15 unit(s)    R superior trapezius injection amount:  15 unit(s)   Total Units      Total units used:  200    Total units discarded:  0   Post-procedure details      Chemodenervation:  Chronic migraine    Facial Nerve Location[de-identified]  Bilateral facial nerve    Patient tolerance of procedure:   Tolerated well, no immediate complications   Comments       45 units frontalis and temporalis  All medically necessary

## 2024-02-08 ENCOUNTER — TELEPHONE (OUTPATIENT)
Dept: NEUROLOGY | Facility: CLINIC | Age: 19
End: 2024-02-08

## 2024-02-08 NOTE — TELEPHONE ENCOUNTER
2/7/24, 12:58    Good morning, this is [facility] calling on a recorded line to schedule the delivery of patient Lucy Maldnoado,  date of birth, march 29th, 2005, Botox injections to be delivered to Dr. Nazario Mack's office. If you could please give us a call back at your earliest convenience at [phone]. Thank you and have a great rest of your day.

## 2024-02-14 NOTE — TELEPHONE ENCOUNTER
received vm from 2/10 at 9:55am-A hi, my name is Marisol and I'm calling from [facility] pharmacy, and I'm calling to schedule a delivery botox injections. And that is for a patient lilia garay, YOB: 2005. Can you please return a phone call to [phone]. Thank you and have a great day

## 2024-02-21 ENCOUNTER — TELEPHONE (OUTPATIENT)
Dept: NEUROLOGY | Facility: CLINIC | Age: 19
End: 2024-02-21

## 2024-02-21 NOTE — TELEPHONE ENCOUNTER
Contacted [facility], spoke to Jnoathan that advised pt did give consent to ship. Claim rejecting stating refil to soon. Rep contacting insurance clear. Delivery set for 2/23/2024.      Botox 200 UNITS  Qty.1  Scheduled:2/23/2024  [facility]   Via: [employer]    Please advise if Botox does not arrive.    Thank you.

## 2024-02-23 NOTE — TELEPHONE ENCOUNTER
Botox number of units: 200  Botox quantity: 1  Arrived at what location: [place]  Botox at Correct Administering Location: yes  NDC number: [id number]  Lot number: [id number]  Expiration Date: 06/30/2026  Appt notes indicate correct medication: yes

## 2024-03-01 ENCOUNTER — PROCEDURE VISIT (OUTPATIENT)
Dept: NEUROLOGY | Facility: CLINIC | Age: 19
End: 2024-03-01
Payer: COMMERCIAL

## 2024-03-01 VITALS
HEIGHT: 63 IN | OXYGEN SATURATION: 97 % | TEMPERATURE: 98.3 F | DIASTOLIC BLOOD PRESSURE: 62 MMHG | WEIGHT: 160 LBS | SYSTOLIC BLOOD PRESSURE: 127 MMHG | HEART RATE: 99 BPM | BODY MASS INDEX: 28.35 KG/M2

## 2024-03-01 DIAGNOSIS — G43.709 CHRONIC MIGRAINE WITHOUT AURA WITHOUT STATUS MIGRAINOSUS, NOT INTRACTABLE: Primary | ICD-10-CM

## 2024-03-01 PROCEDURE — 64615 CHEMODENERV MUSC MIGRAINE: CPT | Performed by: PHYSICIAN ASSISTANT

## 2024-03-01 NOTE — PROGRESS NOTES
"Universal Protocol   Consent: Verbal consent obtained. Written consent obtained.  Risks and benefits: risks, benefits and alternatives were discussed  Consent given by: patient  Time out: Immediately prior to procedure a \"time out\" was called to verify the correct patient, procedure, equipment, support staff and site/side marked as required.  Patient understanding: patient states understanding of the procedure being performed  Patient consent: the patient's understanding of the procedure matches consent given  Procedure consent: procedure consent matches procedure scheduled  Relevant documents: relevant documents present and verified  Patient identity confirmed: verbally with patient      Chemodenervation     Date/Time  3/1/2024 2:30 PM     Performed by  Rosibel Doty PA-C   Authorized by  Rosibel Doty PA-C     Pre-procedure details      Prepped With: Alcohol     Anesthesia  (see MAR for exact dosages):     Anesthesia method:  None   Procedure details      Position:  Upright   Botox      Botox Type:  Type A    Brand:  Botox    mL's of Botulinum Toxin:  200    Final Concentration per CC:  50 units    Needle Gauge:  30 G 2.5 inch   Procedures      Botox Procedures: chronic headache      Indications: migraines     Injection Location      Head / Face:  L superior cervical paraspinal, R superior cervical paraspinal, L , R , L frontalis, R frontalis, L medial occipitalis, R medial occipitalis, procerus, R temporalis, L temporalis, R superior trapezius and L superior trapezius    L  injection amount:  5 unit(s)    R  injection amount:  5 unit(s)    L lateral frontalis:  5 unit(s)    R lateral frontalis:  5 unit(s)    L medial frontalis:  5 unit(s)    R medial frontalis:  5 unit(s)    L temporalis injection amount:  20 unit(s)    R temporalis injection amount:  20 unit(s)    Procerus injection amount:  5 unit(s)    L medial occipitalis injection amount:  15 unit(s)    R medial " occipitalis injection amount:  15 unit(s)    L superior cervical paraspinal injection amount:  10 unit(s)    R superior cervical paraspinal injection amount:  10 unit(s)    L superior trapezius injection amount:  15 unit(s)    R superior trapezius injection amount:  15 unit(s)   Total Units      Total units used:  200    Total units discarded:  0   Post-procedure details      Chemodenervation:  Chronic migraine    Facial Nerve Location::  Bilateral facial nerve    Patient tolerance of procedure:  Tolerated well, no immediate complications   Comments       45 frontalis and temporalis  All medically necessary

## 2024-03-20 ENCOUNTER — TELEPHONE (OUTPATIENT)
Dept: NEUROLOGY | Facility: CLINIC | Age: 19
End: 2024-03-20

## 2024-03-20 DIAGNOSIS — G43.711 INTRACTABLE CHRONIC MIGRAINE WITHOUT AURA AND WITH STATUS MIGRAINOSUS: Primary | ICD-10-CM

## 2024-03-20 RX ORDER — DEXAMETHASONE 4 MG/1
TABLET ORAL
Qty: 9 TABLET | Refills: 0 | Status: SHIPPED | OUTPATIENT
Start: 2024-03-20

## 2024-03-20 RX ORDER — DIVALPROEX SODIUM 500 MG/1
500 TABLET, EXTENDED RELEASE ORAL DAILY
Qty: 5 TABLET | Refills: 0 | Status: SHIPPED | OUTPATIENT
Start: 2024-03-20

## 2024-03-20 NOTE — TELEPHONE ENCOUNTER
received vm 3/18 at 10:42am- Hi, this is Lucy garay. Birth date [birth year], and my phone number is [phone]. I was calling because it seems like my botox isn't really doing what I need it to do. And I am starting a new job and I was just wondering if there are any other medications i can add on because I cannot call out for migraines.  if you can give me a call back. That'd be great. Thank you.  [phone]  -------------------------------------------------------  Left message for pt to call office back or I will send her a [employer] message and she can respond to [employer] message    Mychart message sent to pt

## 2024-03-20 NOTE — TELEPHONE ENCOUNTER
----- Message from Erica Esteves RN sent at 3/20/2024  2:14 PM EDT -----  Regarding: FW: botox and migraines  Contact: [phone]    ----- Message -----  From: Lucy Mcdaniel  Sent: 3/20/2024   1:22 PM EDT  To: Neurology [place] Clinical Team 5  Subject: botox and migraines                              It's been everyday I have been taking nurtec and reyvow with little relief. I recently switched my birth control to nexplanon, could that be the issue? What other options do I have?

## 2024-03-22 ENCOUNTER — TELEPHONE (OUTPATIENT)
Dept: NEUROLOGY | Facility: CLINIC | Age: 19
End: 2024-03-22

## 2024-03-22 DIAGNOSIS — G43.709 CHRONIC MIGRAINE WITHOUT AURA WITHOUT STATUS MIGRAINOSUS, NOT INTRACTABLE: Primary | ICD-10-CM

## 2024-03-22 RX ORDER — SODIUM CHLORIDE 9 MG/ML
20 INJECTION, SOLUTION INTRAVENOUS ONCE
OUTPATIENT
Start: 2024-04-01

## 2024-03-22 NOTE — TELEPHONE ENCOUNTER
Forms have been filled out, signed by Rosibel, and faxed over. Completed Forms have been scanned into the patient's chart.

## 2024-03-22 NOTE — TELEPHONE ENCOUNTER
Filling out form for Vyepti connect for [facility] and orders placed.  Please follow up for prior auth status    ----- Message from Erica Esteves RN sent at 3/22/2024  1:07 PM EDT -----  Regarding: FW: botox and migraines  Contact: [phone]    ----- Message -----  From: Lucy Mcdaniel  Sent: 3/22/2024  12:44 PM EDT  To: Neurology [place] Clinical Team 5  Subject: botox and migraines                              [place] is the closest

## 2024-03-26 NOTE — TELEPHONE ENCOUNTER
Received confirmation fax from [employer] that enrollment form was received.    Fax was dated 3/25  Will await benefits investigation

## 2024-05-01 ENCOUNTER — HOSPITAL ENCOUNTER (OUTPATIENT)
Dept: INFUSION CENTER | Facility: CLINIC | Age: 19
Discharge: HOME | End: 2024-05-01
Payer: COMMERCIAL

## 2024-05-01 VITALS
DIASTOLIC BLOOD PRESSURE: 80 MMHG | TEMPERATURE: 97.3 F | RESPIRATION RATE: 18 BRPM | HEART RATE: 94 BPM | SYSTOLIC BLOOD PRESSURE: 118 MMHG

## 2024-05-01 DIAGNOSIS — G43.709 CHRONIC MIGRAINE WITHOUT AURA WITHOUT STATUS MIGRAINOSUS, NOT INTRACTABLE: Primary | ICD-10-CM

## 2024-05-01 PROCEDURE — 96365 THER/PROPH/DIAG IV INF INIT: CPT

## 2024-05-01 RX ORDER — SODIUM CHLORIDE 9 MG/ML
20 INJECTION, SOLUTION INTRAVENOUS ONCE
Status: COMPLETED | OUTPATIENT
Start: 2024-05-01 | End: 2024-05-01

## 2024-05-01 RX ORDER — SODIUM CHLORIDE 9 MG/ML
20 INJECTION, SOLUTION INTRAVENOUS ONCE
OUTPATIENT
Start: 2024-07-24

## 2024-05-01 RX ADMIN — EPTINEZUMAB-JJMR 100 MG: 100 INJECTION INTRAVENOUS at 08:24

## 2024-05-01 RX ADMIN — SODIUM CHLORIDE 20 ML/HR: 9 INJECTION, SOLUTION INTRAVENOUS at 08:24

## 2024-05-01 NOTE — PROGRESS NOTES
Pt presents for vyepti offering no compliants. Pt tolerated treatment without incident. PIV removed. AVS declined. Next appointment reviewed on 7/24 at 8am.

## 2024-05-14 ENCOUNTER — TELEPHONE (OUTPATIENT)
Dept: NEUROLOGY | Facility: CLINIC | Age: 19
End: 2024-05-14

## 2024-05-14 NOTE — TELEPHONE ENCOUNTER
Proactive scheduling set up for pt with [facility]ie for 16-24 for delivery of Botox.     Please advise if Botox does not arrive by the 24th.

## 2024-05-14 NOTE — TELEPHONE ENCOUNTER
Spoke to Amparo (Pharmacist) with [facility] pharmacy, VO given.    Will be delivery between 16-24 once completed and consent received by pt before these dates above.     Will follow up as able.

## 2024-05-14 NOTE — TELEPHONE ENCOUNTER
Contacted [employer] , spoke to rep that stated pharmacy is requesting additional information for pt;transferred to pharmacy. Spoke to Tessie that advised pt needs a new script for pt.    Do you authorize verbal/written order below for pt?    Botox 200 UNITS  Qty.1  [id number].[id number]  Sig: Inject up to 200 UNITS I.M into the various sites in the head and neck once every three months for one year with  Refills: 3     If you agree to this order transcribed above please respond directly if you agree or not, so I may proceed further with authorization and for use of Verbal Order.     Thank you.

## 2024-05-20 NOTE — TELEPHONE ENCOUNTER
Contacted [employer], spoke to rep that advised a call has been made last to pt 5/18/2024 to follow up for consent for delivery, but pt has been unreachable to obtain consent to ship. Writer advised if a outbound call can be made to pt; writer held line. Pt consent received.     Botox 200 UNITS  Qty.1  Scheduled:5/21/2024  Location:[facility]   Via: [employer]    Please advise if Botox does not arrive.    Thank you.

## 2024-05-21 NOTE — TELEPHONE ENCOUNTER
Botox number of units: 200 units  Botox quantity: 1  Arrived at what location: [place]  Botox at Correct Administering Location: Yes  NDC number: 7713-1340-40  Lot number: [id number]  Expiration Date: 06/01/2026  Appt notes indicate correct medication: Yes

## 2024-05-24 ENCOUNTER — TELEPHONE (OUTPATIENT)
Dept: NEUROLOGY | Facility: CLINIC | Age: 19
End: 2024-05-24

## 2024-05-24 NOTE — TELEPHONE ENCOUNTER
Called pt re: below. Pt states that Vyepti does not work for her. She will discuss it and other option w/ Rosibel at her upcoming appt.      chilo

## 2024-05-24 NOTE — TELEPHONE ENCOUNTER
5/24 12:38pm  Hi, this is Alberto calling from [facility]. I am calling in regards to a patient lilia gerber, date of birth. [birth year]. In regards to her via the infusion, she stated that she no longer wants to be doing the infusion and she doesn't want to take the medication anymore. If you could please, just give me a call back to confirm how you like me to proceed with this. My direct line is [phone]. Thank you.

## 2024-06-03 ENCOUNTER — PROCEDURE VISIT (OUTPATIENT)
Dept: NEUROLOGY | Facility: CLINIC | Age: 19
End: 2024-06-03
Payer: COMMERCIAL

## 2024-06-03 VITALS — HEART RATE: 109 BPM | DIASTOLIC BLOOD PRESSURE: 82 MMHG | SYSTOLIC BLOOD PRESSURE: 117 MMHG | TEMPERATURE: 98.7 F

## 2024-06-03 DIAGNOSIS — G43.709 CHRONIC MIGRAINE WITHOUT AURA WITHOUT STATUS MIGRAINOSUS, NOT INTRACTABLE: Primary | ICD-10-CM

## 2024-06-03 PROCEDURE — 64615 CHEMODENERV MUSC MIGRAINE: CPT | Performed by: PHYSICIAN ASSISTANT

## 2024-06-03 RX ORDER — ATOGEPANT 60 MG/1
60 TABLET ORAL
Qty: 30 TABLET | Refills: 11 | Status: SHIPPED | OUTPATIENT
Start: 2024-06-03

## 2024-06-03 NOTE — PROGRESS NOTES
"Universal Protocol   Consent: Verbal consent obtained. Written consent obtained.  Risks and benefits: risks, benefits and alternatives were discussed  Consent given by: patient  Time out: Immediately prior to procedure a \"time out\" was called to verify the correct patient, procedure, equipment, support staff and site/side marked as required.  Patient understanding: patient states understanding of the procedure being performed  Patient consent: the patient's understanding of the procedure matches consent given  Procedure consent: procedure consent matches procedure scheduled  Relevant documents: relevant documents present and verified  Patient identity confirmed: verbally with patient      Chemodenervation     Date/Time  6/3/2024 3:00 PM     Performed by  Rosibel Doty PA-C   Authorized by  Rosibel Doty PA-C     Pre-procedure details      Preparation: Patient was prepped and draped in usual sterile fashion     Anesthesia  (see MAR for exact dosages):     Anesthesia method:  None   Procedure details      Position:  Upright   Botox      Botox Type:  Type A    Brand:  Botox    mL's of Botulinum Toxin:  200    mL's of preservative free sterile saline:  4    Final Concentration per CC:  50 units    Needle Gauge:  30 G 2.5 inch   Procedures      Botox Procedures: chronic headache     Injection Location      Head / Face:  L superior cervical paraspinal, R superior cervical paraspinal, L , R , R frontalis, L frontalis, R medial occipitalis, L medial occipitalis, procerus, R temporalis, L superior trapezius, R superior trapezius and L temporalis    L  injection amount:  5 unit(s)    R  injection amount:  5 unit(s)    L lateral frontalis:  5 unit(s)    R lateral frontalis:  5 unit(s)    L medial frontalis:  5 unit(s)    R medial frontalis:  5 unit(s)    L temporalis injection amount:  20 unit(s)    R temporalis injection amount:  20 unit(s)    Procerus injection amount:  5 unit(s)    L " medial occipitalis injection amount:  15 unit(s)    R medial occipitalis injection amount:  15 unit(s)    L superior cervical paraspinal injection amount:  10 unit(s)    R superior cervical paraspinal injection amount:  10 unit(s)    L superior trapezius injection amount:  15 unit(s)    R superior trapezius injection amount:  15 unit(s)   Total Units      Total units used:  200   Post-procedure details      Chemodenervation:  Chronic migraine    Facial Nerve Location::  Bilateral facial nerve    Patient tolerance of procedure:  Tolerated well, no immediate complications   Comments        45 frontalis and temporalis  All medically necessary

## 2024-06-25 DIAGNOSIS — G43.711 INTRACTABLE CHRONIC MIGRAINE WITHOUT AURA AND WITH STATUS MIGRAINOSUS: ICD-10-CM

## 2024-06-25 DIAGNOSIS — G43.009 MIGRAINE WITHOUT AURA AND WITHOUT STATUS MIGRAINOSUS, NOT INTRACTABLE: Primary | ICD-10-CM

## 2024-06-25 RX ORDER — DEXAMETHASONE 4 MG/1
TABLET ORAL
Qty: 9 TABLET | Refills: 0 | Status: SHIPPED | OUTPATIENT
Start: 2024-06-25

## 2024-06-25 RX ORDER — CHLORPROMAZINE HYDROCHLORIDE 25 MG/1
25 TABLET, FILM COATED ORAL EVERY 8 HOURS PRN
Qty: 10 TABLET | Refills: 0 | Status: SHIPPED | OUTPATIENT
Start: 2024-06-25

## 2024-06-25 RX ORDER — PROCHLORPERAZINE MALEATE 10 MG
10 TABLET ORAL EVERY 6 HOURS PRN
Qty: 10 TABLET | Refills: 0 | Status: SHIPPED | OUTPATIENT
Start: 2024-06-25

## 2024-06-25 RX ORDER — DIHYDROERGOTAMINE MESYLATE 4 MG/ML
0.72 SPRAY, METERED NASAL AS NEEDED
Qty: 8 ML | Refills: 11 | Status: SHIPPED | OUTPATIENT
Start: 2024-06-25

## 2024-07-24 ENCOUNTER — HOSPITAL ENCOUNTER (OUTPATIENT)
Dept: INFUSION CENTER | Facility: CLINIC | Age: 19
Discharge: HOME | End: 2024-07-24

## 2024-08-07 ENCOUNTER — TELEPHONE (OUTPATIENT)
Dept: NEUROLOGY | Facility: CLINIC | Age: 19
End: 2024-08-07

## 2024-08-21 ENCOUNTER — TELEPHONE (OUTPATIENT)
Dept: NEUROLOGY | Facility: CLINIC | Age: 19
End: 2024-08-21

## 2024-08-21 NOTE — TELEPHONE ENCOUNTER
Botox arrived  Botox number of units: 200  Botox quantity: 1  Arrived at what location: [facility]   Botox at Correct Administering Location: yes  NDC number: 8400-8629-45  Lot number: [id number]  Expiration Date: 12/30/26  Appt notes indicate correct medication: yes

## 2024-08-27 ENCOUNTER — TELEPHONE (OUTPATIENT)
Age: 19
End: 2024-08-27

## 2024-08-27 NOTE — TELEPHONE ENCOUNTER
PT is calling in to reschedule botox appt preferably within the next 2 weeks as cureent appt conflicts with her school schedule.

## 2024-09-03 ENCOUNTER — PROCEDURE VISIT (OUTPATIENT)
Dept: NEUROLOGY | Facility: CLINIC | Age: 19
End: 2024-09-03
Payer: COMMERCIAL

## 2024-09-03 VITALS — TEMPERATURE: 98.8 F | DIASTOLIC BLOOD PRESSURE: 64 MMHG | HEART RATE: 86 BPM | SYSTOLIC BLOOD PRESSURE: 118 MMHG

## 2024-09-03 DIAGNOSIS — G43.709 CHRONIC MIGRAINE WITHOUT AURA WITHOUT STATUS MIGRAINOSUS, NOT INTRACTABLE: Primary | ICD-10-CM

## 2024-09-03 DIAGNOSIS — G43.009 MIGRAINE WITHOUT AURA AND WITHOUT STATUS MIGRAINOSUS, NOT INTRACTABLE: ICD-10-CM

## 2024-09-03 PROCEDURE — 64615 CHEMODENERV MUSC MIGRAINE: CPT | Performed by: PHYSICIAN ASSISTANT

## 2024-09-03 RX ORDER — KETOROLAC TROMETHAMINE 10 MG/1
10 TABLET, FILM COATED ORAL EVERY 6 HOURS PRN
Qty: 10 TABLET | Refills: 0 | Status: SHIPPED | OUTPATIENT
Start: 2024-09-03

## 2024-09-03 RX ORDER — CELECOXIB 120 MG/4.8ML
120 LIQUID ORAL AS NEEDED
Qty: 28.8 ML | Refills: 11 | Status: SHIPPED | OUTPATIENT
Start: 2024-09-03

## 2024-09-03 NOTE — PROGRESS NOTES
"Universal Protocol   procedure performed by consultantConsent: Verbal consent obtained. Written consent obtained.  Risks and benefits: risks, benefits and alternatives were discussed  Consent given by: patient  Time out: Immediately prior to procedure a \"time out\" was called to verify the correct patient, procedure, equipment, support staff and site/side marked as required.  Patient understanding: patient states understanding of the procedure being performed  Patient consent: the patient's understanding of the procedure matches consent given  Procedure consent: procedure consent matches procedure scheduled  Relevant documents: relevant documents present and verified  Patient identity confirmed: verbally with patient      Chemodenervation     Date/Time  9/3/2024 1:30 PM     Performed by  Rosibel Doty PA-C   Authorized by  Rosibel Doty PA-C     Pre-procedure details      Preparation: Patient was prepped and draped in usual sterile fashion     Anesthesia  (see MAR for exact dosages):     Anesthesia method:  None   Procedure details      Position:  Upright   Botox      Botox Type:  Type A    Brand:  Botox    mL's of Botulinum Toxin:  200    mL's of preservative free sterile saline:  4    Final Concentration per CC:  50 units    Needle Gauge:  30 G 2.5 inch   Procedures      Botox Procedures: chronic headache     Injection Location      Head / Face:  L superior cervical paraspinal, R superior cervical paraspinal, L , R , R frontalis, L frontalis, R medial occipitalis, L medial occipitalis, procerus, R temporalis, L superior trapezius, R superior trapezius and L temporalis    L  injection amount:  5 unit(s)    R  injection amount:  5 unit(s)    L lateral frontalis:  5 unit(s)    R lateral frontalis:  5 unit(s)    L medial frontalis:  5 unit(s)    R medial frontalis:  5 unit(s)    L temporalis injection amount:  20 unit(s)    R temporalis injection amount:  20 unit(s)    Procerus " injection amount:  5 unit(s)    L medial occipitalis injection amount:  15 unit(s)    R medial occipitalis injection amount:  15 unit(s)    L superior cervical paraspinal injection amount:  10 unit(s)    R superior cervical paraspinal injection amount:  10 unit(s)    L superior trapezius injection amount:  15 unit(s)    R superior trapezius injection amount:  15 unit(s)   Total Units      Total units used:  200   Post-procedure details      Chemodenervation:  Chronic migraine    Facial Nerve Location::  Bilateral facial nerve    Patient tolerance of procedure:  Tolerated well, no immediate complications   Comments        45 frontalis and temporalis  All medically necessary

## 2024-10-09 ENCOUNTER — OFFICE VISIT (OUTPATIENT)
Dept: NEUROLOGY | Facility: CLINIC | Age: 19
End: 2024-10-09
Payer: COMMERCIAL

## 2024-10-09 ENCOUNTER — TELEPHONE (OUTPATIENT)
Dept: NEUROLOGY | Facility: CLINIC | Age: 19
End: 2024-10-09

## 2024-10-09 VITALS
HEIGHT: 63 IN | WEIGHT: 181 LBS | HEART RATE: 92 BPM | TEMPERATURE: 98.1 F | BODY MASS INDEX: 32.07 KG/M2 | DIASTOLIC BLOOD PRESSURE: 61 MMHG | SYSTOLIC BLOOD PRESSURE: 116 MMHG

## 2024-10-09 DIAGNOSIS — G43.709 CHRONIC MIGRAINE WITHOUT AURA WITHOUT STATUS MIGRAINOSUS, NOT INTRACTABLE: Primary | ICD-10-CM

## 2024-10-09 DIAGNOSIS — G43.009 MIGRAINE WITHOUT AURA AND WITHOUT STATUS MIGRAINOSUS, NOT INTRACTABLE: Primary | ICD-10-CM

## 2024-10-09 DIAGNOSIS — G43.009 MIGRAINE WITHOUT AURA AND WITHOUT STATUS MIGRAINOSUS, NOT INTRACTABLE: ICD-10-CM

## 2024-10-09 PROCEDURE — 99214 OFFICE O/P EST MOD 30 MIN: CPT | Performed by: PHYSICIAN ASSISTANT

## 2024-10-09 RX ORDER — CELECOXIB 120 MG/4.8ML
120 LIQUID ORAL AS NEEDED
Qty: 28.8 ML | Refills: 11 | Status: SHIPPED | OUTPATIENT
Start: 2024-10-09

## 2024-10-09 NOTE — ASSESSMENT & PLAN NOTE
Continue botox every 3 months  Cyclobenzaprine as needed  Cyproheptadine 4 mg as needed      Take nurtec at of migraine  May use elyxyb 1 vial with nurtec for more severe headaches  May use trudhesa 1 spray each nostril.  May repeat in 1-2 hours if needed  If fails or have already taken metoclopramide Limit of 10 a month.

## 2024-10-09 NOTE — PROGRESS NOTES
Neurology Ambulatory Visit  Name: Lucy Mcdaniel       : 2005       MRN: [id number]   Encounter Provider: Rosibel Doty PA-C   Encounter Date: 10/9/2024  Encounter department: [facility]    1. Chronic migraine without aura without status migrainosus, not intractable  Assessment & Plan:  Continue botox every 3 months  Cyclobenzaprine as needed  Cyproheptadine 4 mg as needed      Take nurtec at of migraine  May use elyxyb 1 vial with nurtec for more severe headaches  May use trudhesa 1 spray each nostril.  May repeat in 1-2 hours if needed  If fails or have already taken metoclopramide Limit of 10 a month.   2. Migraine without aura and without status migrainosus, not intractable  -     Celecoxib (Elyxyb) 120 MG/4.8ML SOLN; Take 120 mg by mouth as needed (migraine) Limit of 1 in 24hours      History of Present Illness     HPI   Lucy Mcdaniel is a 19 y.o. Female who presents headaches.  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      Medical history  Insomnia  anxiety,   ADD     What medications do you take or have you taken for your headaches?   Preventive therapy:   Current preventative:  - Botox injections every 3 months  - Cyclobenzaprine  - Cyproheptadine  - qulipta     Current abortive:  - Reglan 5 mg,   - nurtec  - ketorolac  - trudhesa     Previous preventative:  - magnesium, vitamin B2  - amitriptyline  - cyproheptadine  - propanolol, Verapamil   - topiramate 50 mg  - Emgality monthly injections     Previous abortive:  - Maxalt   - Compazine 5 mg,  - Tylenol 325 mg, naproxen p.o.,   - Excedrin migraine  - Nurtec   - Reyvow  - Ketorolac  - Depakote  - Olanzapine  - Fioricet,     How often do the headaches occur?  Headaches: 1 a week  Migraines: 1 migraine every 2-3 months more near end of botox     Are you ever headache free?   Yes     Aura/Warning and how long does it last?  No     What time of the day do the headaches start?   Mild headaches: vary throughout the day, but if  she gets one she often wakes up with it.   migraines: varies     How long do the headaches last?   Headache: 4 hours  Migraines: 2 days.       Where is your headache located?   frontal, and temporal region and behind the eyes.      Describe your usual headache?  throbbing and pounding     What is the intensity of pain?   Headaches: 3/10  migraine: 9/10      Associated symptoms:   - Decreased appetite   Nausea      Vomiting         - Photophobia     Phonophobia      Osmophobia  -  pale  - Dizziness   light headed  - Problems with concentration  - Blurred vision   Change in pupil size     - Insomnia  - Prefer to be in a cool, quiet, dark room     Number of days missed per month because of headaches:  Work (or school) days: none  Social or Family activities: not anymore      Headache are worse if the patient: cough, sneeze, bending over, exertion  Headache triggers:  missing meals, strong perfumes, dehydration  What time of the year do headaches occur more frequently? Summer when it is hot, school year     Have you had trigger point injection performed and how often? No  Have you had Botox injection performed and how often? Yes  Have you had epidural injections or transforaminal injections performed? No     Alternative therapies used in the past for headaches? acupuncture, chiropractor  Have you used CBD or THC for your headaches and how often? In the past but was not too helpful.   How many caffeine products to drink a day? 2 cups of coffee  How much water to drink a day? 3-4 bottles.      Are you current pregnant or planning on getting pregnant?  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  Overall dimensions of the pituitary gland do not appear significantly changed when compared to the 10/19/2019 MRI brain.     I personally reviewed these images         With botox has had a reduction of at least 7 migraine days with less abortive medication,  "less ER visits which correlates to headache diary     Review of Systems      Objective     /61 (BP Location: Left arm, Patient Position: Sitting, Cuff Size: Standard)   Pulse 92   Temp 98.1 °F (36.7 °C) (Temporal)   Ht 5' 3\" (1.6 m)   Wt 82.1 kg (181 lb)   BMI 32.06 kg/m²    Physical Exam  Neurologic Exam  CONSTITUTIONAL: Well developed, well nourished, well groomed. No dysmorphic features.     Eyes:  EOM normal      Neck:  Normal ROM, neck supple.      HEENT:  Normocephalic atraumatic.    Chest:  Respirations regular and unlabored.    Psychiatric:  Normal behavior and appropriate affect      MENTAL STATUS  Orientation: Alert and oriented x 3  Fund of knowledge: Intact.    MOTOR (Upper and lower extremities)   Bulk/tone/abnormal movement: Normal muscle bulk and tone.      COORDINATION   Station/Gait: Normal baseline gait.    Administrative Statements   I have spent a total time of 33 minutes in caring for this patient on the day of the visit/encounter including Prognosis, Instructions for management, Impressions, Counseling / Coordination of care, Documenting in the medical record, Reviewing / ordering tests, medicine, procedures  , and Obtaining or reviewing history  .      "

## 2024-10-15 NOTE — TELEPHONE ENCOUNTER
Called and advised of the below. She verbalized understanding. Pt states that she will leave it up to Rosibel to decide since she knows what is best for her.  She is willing to try any med that Rosibel recommends.

## 2024-10-15 NOTE — TELEPHONE ENCOUNTER
Please let patient know.  There are quite a lot of drugs she has not tried on there yet.  Please ask her if she would like to see if any of them help or if she just wants to continue with what she is doing

## 2024-10-16 RX ORDER — ZOLMITRIPTAN 5 MG/1
5 TABLET, FILM COATED ORAL AS NEEDED
Qty: 9 TABLET | Refills: 0 | Status: SHIPPED | OUTPATIENT
Start: 2024-10-16

## 2024-10-16 RX ORDER — SUMATRIPTAN 3 MG/1
3 INJECTION, SOLUTION SUBCUTANEOUS AS NEEDED
Qty: 3 ML | Refills: 1 | Status: SHIPPED | OUTPATIENT
Start: 2024-10-16

## 2024-10-16 NOTE — TELEPHONE ENCOUNTER
I sent in zolmitriptan for her to try.  If this fails, please have her reach out to try another on the list.  Zolmitriptan will be the third triptan as she has already tried and failed sumatriptan and rizatriptan.  She is also tried and failed Nurtec.    I am also sending in zembrace for her as this is another alternative    We would likely need to try to Onzetra as well if the above fails.

## 2024-10-16 NOTE — TELEPHONE ENCOUNTER
Called and left a detailed message on pt's answering machine of the below (ok per consent) and for a call back if any questions.

## 2024-11-19 ENCOUNTER — TELEPHONE (OUTPATIENT)
Dept: NEUROLOGY | Facility: CLINIC | Age: 19
End: 2024-11-19

## 2024-11-19 DIAGNOSIS — G43.009 MIGRAINE WITHOUT AURA AND WITHOUT STATUS MIGRAINOSUS, NOT INTRACTABLE: Primary | ICD-10-CM

## 2024-11-19 RX ORDER — SUMATRIPTAN SUCCINATE 11 MG/1
1 CAPSULE NASAL AS NEEDED
Qty: 2 EACH | Refills: 1 | Status: SHIPPED | OUTPATIENT
Start: 2024-11-19

## 2024-11-19 NOTE — TELEPHONE ENCOUNTER
Please let patient know I sent in Ubrelvy and Onzetra for her to try.  This would complete all of the trial of medications that she would need if these fail then we can reAuth for Trudhesa

## 2024-11-19 NOTE — TELEPHONE ENCOUNTER
PA started on CMM.  (Key: [id number])    One of the question from the plan-  Is the patient unable to take the required number of formulary alternatives for the given diagnosis due to a trial and inadequate treatment response or intolerance, or a contraindication? Available Formulary Alternatives: 3 in a class with 3 or more alternatives, eletriptan, naratriptan, rizatriptan, sumatriptan, zolmitriptan, NURTEC ODT, ONZETRA XSAIL, UBRELVY, ZEMBRACE SYMTOUCH. ACTION REQUIRED: If yes, then documentation is required for approval. Provide documentation including name of medication(s) tried and clinical explanation for treatment failure(s), intolerance, and/or contraindication (whichever are applicable) for each medication and minimum number of required formulary alternatives     Per office notes-  Previous abortive:  - Maxalt   - Compazine 5 mg,  - Tylenol 325 mg, naproxen p.o.,   - Excedrin migraine  - Nurtec   - Reyvow  - Ketorolac  - Depakote  - Olanzapine  - Fioricet,

## 2024-11-20 NOTE — TELEPHONE ENCOUNTER
Called [phone] and left a detailed message on pt's answering machine of the below and for a call back if any questions.     Ubrelvy PA initiated on CM. (Key: [id number])   Your PA has been resolved, no additional PA is required

## 2024-12-03 ENCOUNTER — TELEPHONE (OUTPATIENT)
Dept: NEUROLOGY | Facility: CLINIC | Age: 19
End: 2024-12-03

## 2024-12-03 NOTE — TELEPHONE ENCOUNTER
Botox delivered today to [facility] office  Botox number of units: 200  Botox quantity: 1 box  Arrived at what location: [facility]  Botox at Correct Administering Location: yes  NDC number: 6530-2627-07  Lot number: [id number]  Expiration Date: 09/2026  Appt notes indicate correct medication: yes

## 2024-12-09 ENCOUNTER — TELEPHONE (OUTPATIENT)
Age: 19
End: 2024-12-09

## 2024-12-09 NOTE — TELEPHONE ENCOUNTER
Pt called in and needs to R/S her Botox appt.     Pt is unable to make the appt scheduled for 12/10/24 at 11:30 am with Rosibel Doty- [facility] office.     I checked and the next available date is not until Jan, 9th 2025.     Please assist with R/S pt Botox appt.     Patient requesting a call back.

## 2024-12-09 NOTE — TELEPHONE ENCOUNTER
Called patient back and I rescheduled her to 12/13/24 at 07:30am.   Can you please assist with the reschedule.   Thank you!

## 2024-12-13 ENCOUNTER — PROCEDURE VISIT (OUTPATIENT)
Dept: NEUROLOGY | Facility: CLINIC | Age: 19
End: 2024-12-13
Payer: COMMERCIAL

## 2024-12-13 VITALS — TEMPERATURE: 98.1 F | SYSTOLIC BLOOD PRESSURE: 114 MMHG | DIASTOLIC BLOOD PRESSURE: 74 MMHG | HEART RATE: 96 BPM

## 2024-12-13 DIAGNOSIS — G43.709 CHRONIC MIGRAINE WITHOUT AURA WITHOUT STATUS MIGRAINOSUS, NOT INTRACTABLE: Primary | ICD-10-CM

## 2024-12-13 PROCEDURE — 64615 CHEMODENERV MUSC MIGRAINE: CPT | Performed by: PHYSICIAN ASSISTANT

## 2024-12-13 NOTE — PROGRESS NOTES
"Universal Protocol   procedure performed by consultantConsent: Verbal consent obtained. Written consent obtained.  Risks and benefits: risks, benefits and alternatives were discussed  Consent given by: patient  Time out: Immediately prior to procedure a \"time out\" was called to verify the correct patient, procedure, equipment, support staff and site/side marked as required.  Patient understanding: patient states understanding of the procedure being performed  Patient consent: the patient's understanding of the procedure matches consent given  Procedure consent: procedure consent matches procedure scheduled  Relevant documents: relevant documents present and verified  Patient identity confirmed: verbally with patient      Chemodenervation     Date/Time  12/13/2024 7:30 AM     Performed by  Rosibel Doty PA-C   Authorized by  Rosibel Doty PA-C     Pre-procedure details      Preparation: Patient was prepped and draped in usual sterile fashion     Anesthesia  (see MAR for exact dosages):     Anesthesia method:  None   Procedure details      Position:  Upright   Botox      Botox Type:  Type A    Brand:  Botox    mL's of Botulinum Toxin:  200    mL's of preservative free sterile saline:  4    Final Concentration per CC:  50 units    Needle Gauge:  30 G 2.5 inch   Procedures      Botox Procedures: chronic headache     Injection Location      Head / Face:  L superior cervical paraspinal, R superior cervical paraspinal, L , R , R frontalis, L frontalis, R medial occipitalis, L medial occipitalis, procerus, R temporalis, L superior trapezius, R superior trapezius and L temporalis    L  injection amount:  5 unit(s)    R  injection amount:  5 unit(s)    L lateral frontalis:  5 unit(s)    R lateral frontalis:  5 unit(s)    L medial frontalis:  5 unit(s)    R medial frontalis:  5 unit(s)    L temporalis injection amount:  20 unit(s)    R temporalis injection amount:  20 unit(s)    Procerus " injection amount:  5 unit(s)    L medial occipitalis injection amount:  15 unit(s)    R medial occipitalis injection amount:  15 unit(s)    L superior cervical paraspinal injection amount:  10 unit(s)    R superior cervical paraspinal injection amount:  10 unit(s)    L superior trapezius injection amount:  15 unit(s)    R superior trapezius injection amount:  15 unit(s)   Total Units      Total units used:  200   Post-procedure details      Chemodenervation:  Chronic migraine    Facial Nerve Location::  Bilateral facial nerve    Patient tolerance of procedure:  Tolerated well, no immediate complications   Comments        45 frontalis and temporalis  All medically necessary

## 2025-02-10 ENCOUNTER — TELEPHONE (OUTPATIENT)
Dept: NEUROLOGY | Facility: CLINIC | Age: 20
End: 2025-02-10

## 2025-02-10 NOTE — TELEPHONE ENCOUNTER
Received via fax prior auth for medication Trudhesa forms have been scanned into the patients chart.

## 2025-02-12 NOTE — TELEPHONE ENCOUNTER
Fax is from [facility]- per fax      We are not appealing trudhesa denial.  Pt prescribed an alternative med.  See 11/19/24 encounter.      Noted on form that we are not appealing.  Form scanned under media.  Form faxed to [facility]

## 2025-03-04 ENCOUNTER — TELEPHONE (OUTPATIENT)
Dept: NEUROLOGY | Facility: CLINIC | Age: 20
End: 2025-03-04

## 2025-03-04 NOTE — TELEPHONE ENCOUNTER
Botox number of units: 200  Botox quantity: 1 box  Arrived at what location: [facility]  Botox at Correct Administering Location: yes  NDC number: 4829-7311-97  Lot number: [id number]  Expiration Date: 04/2027  Appt notes indicate correct medication: yes

## 2025-03-21 ENCOUNTER — PROCEDURE VISIT (OUTPATIENT)
Dept: NEUROLOGY | Facility: CLINIC | Age: 20
End: 2025-03-21
Payer: COMMERCIAL

## 2025-03-21 VITALS — SYSTOLIC BLOOD PRESSURE: 116 MMHG | HEART RATE: 84 BPM | DIASTOLIC BLOOD PRESSURE: 80 MMHG | TEMPERATURE: 97.7 F

## 2025-03-21 DIAGNOSIS — G43.709 CHRONIC MIGRAINE WITHOUT AURA WITHOUT STATUS MIGRAINOSUS, NOT INTRACTABLE: Primary | ICD-10-CM

## 2025-03-21 PROCEDURE — 64615 CHEMODENERV MUSC MIGRAINE: CPT | Performed by: PHYSICIAN ASSISTANT

## 2025-03-21 NOTE — PROGRESS NOTES
"Universal Protocol   procedure performed by consultantConsent: Verbal consent obtained. Written consent obtained.  Risks and benefits: risks, benefits and alternatives were discussed  Consent given by: patient  Time out: Immediately prior to procedure a \"time out\" was called to verify the correct patient, procedure, equipment, support staff and site/side marked as required.  Patient understanding: patient states understanding of the procedure being performed  Patient consent: the patient's understanding of the procedure matches consent given  Procedure consent: procedure consent matches procedure scheduled  Relevant documents: relevant documents present and verified  Patient identity confirmed: verbally with patient      Chemodenervation     Date/Time  3/21/2025 1:30 PM     Performed by  Rosibel Doty PA-C   Authorized by  Rosibel Doty PA-C     Pre-procedure details      Preparation: Patient was prepped and draped in usual sterile fashion     Anesthesia  (see MAR for exact dosages):     Anesthesia method:  None   Procedure details      Position:  Upright   Botox      Botox Type:  Type A    Brand:  Botox    mL's of Botulinum Toxin:  200    mL's of preservative free sterile saline:  4    Final Concentration per CC:  50 units    Needle Gauge:  30 G 2.5 inch   Procedures      Botox Procedures: chronic headache     Injection Location      Head / Face:  L superior cervical paraspinal, R superior cervical paraspinal, L , R , R frontalis, L frontalis, R medial occipitalis, L medial occipitalis, procerus, R temporalis, L superior trapezius, R superior trapezius and L temporalis    L  injection amount:  5 unit(s)    R  injection amount:  5 unit(s)    L lateral frontalis:  5 unit(s)    R lateral frontalis:  5 unit(s)    L medial frontalis:  5 unit(s)    R medial frontalis:  5 unit(s)    L temporalis injection amount:  20 unit(s)    R temporalis injection amount:  20 unit(s)    Procerus " injection amount:  5 unit(s)    L medial occipitalis injection amount:  15 unit(s)    R medial occipitalis injection amount:  15 unit(s)    L superior cervical paraspinal injection amount:  10 unit(s)    R superior cervical paraspinal injection amount:  10 unit(s)    L superior trapezius injection amount:  15 unit(s)    R superior trapezius injection amount:  15 unit(s)   Total Units      Total units used:  200   Post-procedure details      Chemodenervation:  Chronic migraine    Facial Nerve Location::  Bilateral facial nerve    Patient tolerance of procedure:  Tolerated well, no immediate complications   Comments        45 frontalis and temporalis  All medically necessary

## 2025-05-21 DIAGNOSIS — G43.709 CHRONIC MIGRAINE WITHOUT AURA, NOT INTRACTABLE, WITHOUT STATUS MIGRAINOSUS: ICD-10-CM

## 2025-05-21 RX ORDER — ONABOTULINUMTOXINA 200 [USP'U]/1
200 INJECTION, POWDER, LYOPHILIZED, FOR SOLUTION INTRADERMAL; INTRAMUSCULAR
Qty: 1 EACH | Refills: 3 | Status: SHIPPED | OUTPATIENT
Start: 2025-05-21

## 2025-06-18 ENCOUNTER — TELEPHONE (OUTPATIENT)
Dept: NEUROLOGY | Facility: CLINIC | Age: 20
End: 2025-06-18

## 2025-06-18 NOTE — TELEPHONE ENCOUNTER
Botox number of units: 200 units  Botox quantity: 1  Arrived at what location: [place]  Botox at Correct Administering Location: Yes  NDC number: 7513-1283-21  Lot number: [id number]  Expiration Date: 11/01/2027  Appt notes indicate correct medication: Yes

## 2025-06-24 ENCOUNTER — PROCEDURE VISIT (OUTPATIENT)
Dept: NEUROLOGY | Facility: CLINIC | Age: 20
End: 2025-06-24
Payer: COMMERCIAL

## 2025-06-24 VITALS — DIASTOLIC BLOOD PRESSURE: 82 MMHG | TEMPERATURE: 98.1 F | HEART RATE: 86 BPM | SYSTOLIC BLOOD PRESSURE: 114 MMHG

## 2025-06-24 DIAGNOSIS — G43.709 CHRONIC MIGRAINE WITHOUT AURA WITHOUT STATUS MIGRAINOSUS, NOT INTRACTABLE: Primary | ICD-10-CM

## 2025-06-24 PROCEDURE — 64615 CHEMODENERV MUSC MIGRAINE: CPT | Performed by: PHYSICIAN ASSISTANT

## 2025-06-24 NOTE — PROGRESS NOTES
"Universal Protocol   procedure performed by consultantConsent: Verbal consent obtained. Written consent obtained  Risks and benefits: risks, benefits and alternatives were discussed  Consent given by: patient  Time out: Immediately prior to procedure a \"time out\" was called to verify the correct patient, procedure, equipment, support staff and site/side marked as required.  Patient understanding: patient states understanding of the procedure being performed  Patient consent: the patient's understanding of the procedure matches consent given  Procedure consent: procedure consent matches procedure scheduled  Relevant documents: relevant documents present and verified  Patient identity confirmed: verbally with patient      Chemodenervation     Date/Time  6/24/2025 12:00 PM     Performed by  Rosibel Doty PA-C   Authorized by  Rosibel Doty PA-C     Pre-procedure details      Preparation: Patient was prepped and draped in usual sterile fashion     Anesthesia  (see MAR for exact dosages):     Anesthesia method:  None   Procedure details      Position:  Upright   Botox      Botox Type:  Type A    Brand:  Botox    mL's of Botulinum Toxin:  200    mL's of preservative free sterile saline:  4    Final Concentration per CC:  50 units    Needle Gauge:  30 G 2.5 inch   Procedures      Botox Procedures: chronic headache     Injection Location      Head / Face:  L superior cervical paraspinal, R superior cervical paraspinal, L , R , R frontalis, L frontalis, R medial occipitalis, L medial occipitalis, procerus, R temporalis, L superior trapezius, R superior trapezius and L temporalis    L  injection amount:  5 unit(s)    R  injection amount:  5 unit(s)    L lateral frontalis:  5 unit(s)    R lateral frontalis:  5 unit(s)    L medial frontalis:  5 unit(s)    R medial frontalis:  5 unit(s)    L temporalis injection amount:  20 unit(s)    R temporalis injection amount:  20 unit(s)    Procerus " injection amount:  5 unit(s)    L medial occipitalis injection amount:  15 unit(s)    R medial occipitalis injection amount:  15 unit(s)    L superior cervical paraspinal injection amount:  10 unit(s)    R superior cervical paraspinal injection amount:  10 unit(s)    L superior trapezius injection amount:  15 unit(s)    R superior trapezius injection amount:  15 unit(s)   Total Units      Total units used:  200   Post-procedure details      Chemodenervation:  Chronic migraine    Facial Nerve Location::  Bilateral facial nerve    Patient tolerance of procedure:  Tolerated well, no immediate complications   Comments        45 frontalis and temporalis  All medically necessary

## 2025-08-14 ENCOUNTER — OFFICE VISIT (OUTPATIENT)
Dept: NEUROLOGY | Facility: CLINIC | Age: 20
End: 2025-08-14
Payer: COMMERCIAL

## 2025-08-14 VITALS
DIASTOLIC BLOOD PRESSURE: 76 MMHG | BODY MASS INDEX: 29.88 KG/M2 | HEART RATE: 103 BPM | WEIGHT: 175 LBS | HEIGHT: 64 IN | OXYGEN SATURATION: 99 % | SYSTOLIC BLOOD PRESSURE: 110 MMHG

## 2025-08-14 DIAGNOSIS — D35.2 PITUITARY ADENOMA (CMS/HCC): ICD-10-CM

## 2025-08-14 DIAGNOSIS — E55.9 VITAMIN D DEFICIENCY: ICD-10-CM

## 2025-08-14 DIAGNOSIS — E53.8 VITAMIN B12 DEFICIENCY: ICD-10-CM

## 2025-08-14 DIAGNOSIS — D50.9 IRON DEFICIENCY ANEMIA, UNSPECIFIED IRON DEFICIENCY ANEMIA TYPE: ICD-10-CM

## 2025-08-14 DIAGNOSIS — G43.709 CHRONIC MIGRAINE WITHOUT AURA WITHOUT STATUS MIGRAINOSUS, NOT INTRACTABLE: Primary | ICD-10-CM

## 2025-08-14 PROBLEM — G43.009 MIGRAINE WITHOUT AURA AND RESPONSIVE TO TREATMENT: Status: ACTIVE | Noted: 2020-05-18

## 2025-08-14 PROBLEM — N20.0 CALCULUS OF KIDNEY: Status: ACTIVE | Noted: 2023-06-09

## 2025-08-14 PROBLEM — R51.9 CHRONIC DAILY HEADACHE: Status: RESOLVED | Noted: 2019-11-12 | Resolved: 2025-08-14

## 2025-08-14 PROBLEM — R51.9 HEADACHE: Status: RESOLVED | Noted: 2023-06-09 | Resolved: 2025-08-14

## 2025-08-14 PROBLEM — F51.01 PRIMARY INSOMNIA: Status: RESOLVED | Noted: 2020-05-18 | Resolved: 2025-08-14

## 2025-08-14 PROBLEM — R51.9 CHRONIC DAILY HEADACHE: Status: ACTIVE | Noted: 2019-11-12

## 2025-08-14 PROBLEM — E28.2 POLYCYSTIC OVARIAN SYNDROME: Status: ACTIVE | Noted: 2025-08-14

## 2025-08-14 PROBLEM — G43.909 MIGRAINE: Status: ACTIVE | Noted: 2022-09-26

## 2025-08-14 PROBLEM — K21.9 ESOPHAGEAL REFLUX: Status: ACTIVE | Noted: 2022-09-26

## 2025-08-14 PROBLEM — G43.009 MIGRAINE WITHOUT AURA AND RESPONSIVE TO TREATMENT: Status: RESOLVED | Noted: 2020-05-18 | Resolved: 2025-08-14

## 2025-08-14 PROBLEM — F51.01 PRIMARY INSOMNIA: Status: ACTIVE | Noted: 2020-05-18

## 2025-08-14 PROBLEM — R51.9 HEADACHE: Status: ACTIVE | Noted: 2023-06-09

## 2025-08-14 PROBLEM — R79.89 ELEVATED PARATHYROID HORMONE: Status: ACTIVE | Noted: 2023-07-13

## 2025-08-14 PROBLEM — G43.909 MIGRAINE: Status: RESOLVED | Noted: 2022-09-26 | Resolved: 2025-08-14

## 2025-08-14 RX ORDER — CELECOXIB 120 MG/4.8ML
120 LIQUID ORAL
COMMUNITY
Start: 2024-10-09

## 2025-08-14 RX ORDER — SUMATRIPTAN SUCCINATE 11 MG/1
1 CAPSULE NASAL
COMMUNITY
Start: 2024-11-19 | End: 2025-08-14

## 2025-08-14 RX ORDER — ONDANSETRON 4 MG/1
4 TABLET, ORALLY DISINTEGRATING ORAL
COMMUNITY
Start: 2024-11-15

## 2025-08-14 RX ORDER — ONABOTULINUMTOXINA 200 [USP'U]/1
INJECTION, POWDER, LYOPHILIZED, FOR SOLUTION INTRADERMAL; INTRAMUSCULAR
COMMUNITY
Start: 2024-12-13

## 2025-08-14 RX ORDER — NAPROXEN SODIUM 220 MG
220 TABLET ORAL
COMMUNITY

## 2025-08-14 RX ORDER — DEXAMETHASONE 2 MG/1
TABLET ORAL
Qty: 5 TABLET | Refills: 1 | Status: SHIPPED | OUTPATIENT
Start: 2025-08-14

## 2025-08-14 RX ORDER — ELETRIPTAN HYDROBROMIDE 40 MG/1
40 TABLET, FILM COATED ORAL ONCE AS NEEDED
Qty: 12 TABLET | Refills: 1 | Status: SHIPPED | OUTPATIENT
Start: 2025-08-14 | End: 2025-09-13

## 2025-08-26 ENCOUNTER — TELEPHONE (OUTPATIENT)
Facility: HOSPITAL | Age: 20
End: 2025-08-26
Payer: COMMERCIAL

## 2025-09-05 RX ORDER — OLANZAPINE 2.5 MG/1
TABLET, FILM COATED ORAL
Qty: 5 TABLET | Refills: 0 | Status: SHIPPED | OUTPATIENT
Start: 2025-09-05